# Patient Record
Sex: FEMALE | Race: WHITE | URBAN - METROPOLITAN AREA
[De-identification: names, ages, dates, MRNs, and addresses within clinical notes are randomized per-mention and may not be internally consistent; named-entity substitution may affect disease eponyms.]

---

## 2017-11-17 ENCOUNTER — OFFICE VISIT (OUTPATIENT)
Dept: ORTHOPEDICS | Facility: CLINIC | Age: 32
End: 2017-11-17
Payer: COMMERCIAL

## 2017-11-17 VITALS
DIASTOLIC BLOOD PRESSURE: 72 MMHG | BODY MASS INDEX: 21.03 KG/M2 | WEIGHT: 134 LBS | HEIGHT: 67 IN | SYSTOLIC BLOOD PRESSURE: 115 MMHG | HEART RATE: 52 BPM

## 2017-11-17 DIAGNOSIS — M25.532 LEFT WRIST PAIN: ICD-10-CM

## 2017-11-17 DIAGNOSIS — M65.4 RADIAL STYLOID TENOSYNOVITIS: Primary | ICD-10-CM

## 2017-11-17 PROCEDURE — 99203 OFFICE O/P NEW LOW 30 MIN: CPT | Performed by: FAMILY MEDICINE

## 2017-11-17 NOTE — LETTER
"    2017         RE: Ca Diane  [address] [address]        Dear Colleague,    Thank you for referring your patient, Ca Diane, to the [facility]. Please see a copy of my visit note below.    Ca Diane  :  1985  DOS: 2017  MRN: [id number]    Sports Medicine Clinic Visit        Ca Diane is a 32 year old Right hand dominant female who is seen as an AIC patient presenting with left radial sided wrist pain that began 2 months ago.    Injury: insidious onset.  Pain located over left radial styloid, radiating to the forearm  Additional Features:  Positive: weakness and pain.  Symptoms are better with No Treatment tried to date.  Symptoms are worse with: gripping, pinching, twisting.  Other evaluation and/or treatments so far consists of: Ice and Tylenol.  Recent imaging completed: No recent imaging completed.  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    No past medical history on file.  No past surgical history on file.    Objective  /72  Pulse 52  Ht 5' 6.76\" (1.696 m)  Wt 134 lb (60.8 kg)  BMI 21.14 kg/m2    General: healthy, alert and in no distress    HEENT: no scleral icterus or conjunctival erythema   Skin: no suspicious lesions or rash. No jaundice.   CV: regular rhythm by palpation, 2+ distal pulses, no pedal edema    Resp: normal respiratory effort without conversational dyspnea   Psych: normal mood and affect    Gait: nonantalgic, appropriate coordination and balance   Neuro: normal light touch sensory exam of the extremities. Motor strength as noted below       Left Wrist and Hand exam    Inspection:       Swelling: very mild 1st dorsal compartment    Tender:       " 1st dorsal compartment    Non Tender:       Remainder of the Wrist and Hand left,      distal radius left,      anatomic snuffbox left,      scapholunate interval left and      TFCC left    ROM:       Full and symmetric active and passive range of motion of the forearm, wrist and digits bilateral    Strength:       5/5 strength in the muscles of the hand, wrist and forearm bilateral    Special Tests:        neg (-) Tinel's test left,       neg (-) Phatlen's test left,       neg (-) TFCC compression test left and       positive (+) Finkelstein's maneuver left    Neurovascular:       2+ radial pulses bilaterally with brisk capillary refill and      normal sensation to light touch in the radial, median and ulnar nerve distributions      Radiology:  No imaging needed today    Assessment:  1. Radial styloid tenosynovitis    2. Left wrist pain        Plan:  Discussed the assessment with the patient.  Follow up: prn  Reviewed options for conservative care for Dequervain's  She has risk factors including recent pregnancy, ongoing breastfeeding, and likely more significant with positional/postural pain and lifting with growing infant  Reviewed RICE and thumb spica bracing, she declines brace today  She can return for this anytime without appt if needed, and provided examples of online braces to purchase as well  CSI available for labile sx but hope to avoid  Ergonomics reviewed  We discussed modified progressive pain-free activity as tolerated  Home handouts provided and supportive care reviewed  All questions were answered today  Contact us with additional questions or concerns  Signs and sx of concern reviewed      Seamus Oconnor DO, CHARITY  Primary Care Sports Medicine  [facility]             Disclaimer: This note consists of symbols derived from keyboarding, dictation and/or voice recognition software. As a result, there may be errors in the script that have gone undetected. Please consider this when  interpreting information found in this chart.      Again, thank you for allowing me to participate in the care of your patient.        Sincerely,        Seamus Oconnor, DO

## 2017-11-17 NOTE — PROGRESS NOTES
"Ca Diane  :  1985  DOS: 2017  MRN: [id number]    Sports Medicine Clinic Visit        Ca Diane is a 32 year old Right hand dominant female who is seen as an AIC patient presenting with left radial sided wrist pain that began 2 months ago.    Injury: insidious onset.  Pain located over left radial styloid, radiating to the forearm  Additional Features:  Positive: weakness and pain.  Symptoms are better with No Treatment tried to date.  Symptoms are worse with: gripping, pinching, twisting.  Other evaluation and/or treatments so far consists of: Ice and Tylenol.  Recent imaging completed: No recent imaging completed.  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    No past medical history on file.  No past surgical history on file.    Objective  /72  Pulse 52  Ht 5' 6.76\" (1.696 m)  Wt 134 lb (60.8 kg)  BMI 21.14 kg/m2    General: healthy, alert and in no distress    HEENT: no scleral icterus or conjunctival erythema   Skin: no suspicious lesions or rash. No jaundice.   CV: regular rhythm by palpation, 2+ distal pulses, no pedal edema    Resp: normal respiratory effort without conversational dyspnea   Psych: normal mood and affect    Gait: nonantalgic, appropriate coordination and balance   Neuro: normal light touch sensory exam of the extremities. Motor strength as noted below       Left Wrist and Hand exam    Inspection:       Swelling: very mild 1st dorsal compartment    Tender:       1st dorsal compartment    Non Tender:       Remainder of the Wrist and Hand left,      distal radius left,      anatomic snuffbox left,      scapholunate interval left and      TFCC left    ROM:       Full and symmetric active and passive range of " motion of the forearm, wrist and digits bilateral    Strength:       5/5 strength in the muscles of the hand, wrist and forearm bilateral    Special Tests:        neg (-) Tinel's test left,       neg (-) Phatlen's test left,       neg (-) TFCC compression test left and       positive (+) Finkelstein's maneuver left    Neurovascular:       2+ radial pulses bilaterally with brisk capillary refill and      normal sensation to light touch in the radial, median and ulnar nerve distributions      Radiology:  No imaging needed today    Assessment:  1. Radial styloid tenosynovitis    2. Left wrist pain        Plan:  Discussed the assessment with the patient.  Follow up: prn  Reviewed options for conservative care for Dequervain's  She has risk factors including recent pregnancy, ongoing breastfeeding, and likely more significant with positional/postural pain and lifting with growing infant  Reviewed RICE and thumb spica bracing, she declines brace today  She can return for this anytime without appt if needed, and provided examples of online braces to purchase as well  CSI available for labile sx but hope to avoid  Ergonomics reviewed  We discussed modified progressive pain-free activity as tolerated  Home handouts provided and supportive care reviewed  All questions were answered today  Contact us with additional questions or concerns  Signs and sx of concern reviewed      Seamus Oconnor DO, CHARITY  Primary Care Sports Medicine  [facility]             Disclaimer: This note consists of symbols derived from keyboarding, dictation and/or voice recognition software. As a result, there may be errors in the script that have gone undetected. Please consider this when interpreting information found in this chart.

## 2017-11-17 NOTE — MR AVS SNAPSHOT
"              After Visit Summary   2017    Ca Diane    MRN: [id number]           Patient Information     Date Of Birth          1985        Visit Information        Provider Department      2017 1:20 PM Seamus Oconnor,  [facility]        Today's Diagnoses     Radial styloid tenosynovitis    -  1    Left wrist pain           Follow-ups after your visit        Who to contact     If you have questions or need follow up information about today's clinic visit or your schedule please contact [facility] directly at [phone].  Normal or non-critical lab and imaging results will be communicated to you by Ubersensehart, letter or phone within 4 business days after the clinic has received the results. If you do not hear from us within 7 days, please contact the clinic through Ubersensehart or phone. If you have a critical or abnormal lab result, we will notify you by phone as soon as possible.  Submit refill requests through BOKU or call your pharmacy and they will forward the refill request to us. Please allow 3 business days for your refill to be completed.          Additional Information About Your Visit        MyChart Information     BOKU lets you send messages to your doctor, view your test results, renew your prescriptions, schedule appointments and more. To sign up, go to [email or web address] . Click on \"Log in\" on the left side of the screen, which will take you to the Welcome page. Then click on \"Sign up Now\" on the right side of the page.     You will be asked to enter the access code listed below, as well as some personal information. Please follow the directions to create your username and password.     Your access code is: [email or web address]  Expires: 2018 10:28 AM     Your access code will  in 90 days. If you need help or a new code, please call your [facility] or [phone].        Care EveryWhere ID     " "This is your Care EveryWhere ID. This could be used by other organizations to access your [facility] medical records  [id number]        Your Vitals Were     Pulse Height BMI (Body Mass Index)             52 5' 6.76\" (1.696 m) 21.14 kg/m2          Blood Pressure from Last 3 Encounters:   11/17/17 115/72    Weight from Last 3 Encounters:   11/17/17 134 lb (60.8 kg)              Today, you had the following     No orders found for display       Primary Care Provider    Physician No Ref-Primary       NO REF-PRIMARY PHYSICIAN        Equal Access to Services     [facility]: Hadii aad ku hadasho Soomaali, waaxda luqadaha, qaybta kaalmada adeegyada, holly romero . So [facility] [phone].    ATENCIÓN: Si habla español, tiene a johnson disposición servicios gratuitos de asistencia lingüística. Llame al [phone].    We comply with applicable federal civil rights laws and [place] laws. We do not discriminate on the basis of race, color, national origin, age, disability, sex, sexual orientation, or gender identity.            Thank you!     Thank you for choosing [facility]  for your care. Our goal is always to provide you with excellent care. Hearing back from our patients is one way we can continue to improve our services. Please take a few minutes to complete the written survey that you may receive in the mail after your visit with us. Thank you!             Your Updated Medication List - Protect others around you: Learn how to safely use, store and throw away your medicines at www.disposemymeds.org.          This list is accurate as of: 11/17/17 11:59 PM.  Always use your most recent med list.                   Brand Name Dispense Instructions for use Diagnosis    PRENATAL VITAMIN PO             "

## 2017-11-21 ENCOUNTER — TELEPHONE (OUTPATIENT)
Dept: ORTHOPEDICS | Facility: CLINIC | Age: 32
End: 2017-11-21

## 2017-11-21 NOTE — TELEPHONE ENCOUNTER
Call made to patient in follow up to AIC Visit.  LVM for return call.   Unable to LVM. Number not in service.

## 2019-05-03 ENCOUNTER — HEALTH MAINTENANCE LETTER (OUTPATIENT)
Age: 34
End: 2019-05-03

## 2019-11-06 ENCOUNTER — HEALTH MAINTENANCE LETTER (OUTPATIENT)
Age: 34
End: 2019-11-06

## 2020-04-24 ENCOUNTER — MEDICAL CORRESPONDENCE (OUTPATIENT)
Dept: HEALTH INFORMATION MANAGEMENT | Facility: CLINIC | Age: 35
End: 2020-04-24

## 2020-11-29 ENCOUNTER — HEALTH MAINTENANCE LETTER (OUTPATIENT)
Age: 35
End: 2020-11-29

## 2021-09-19 ENCOUNTER — HEALTH MAINTENANCE LETTER (OUTPATIENT)
Age: 36
End: 2021-09-19

## 2022-01-09 ENCOUNTER — HEALTH MAINTENANCE LETTER (OUTPATIENT)
Age: 37
End: 2022-01-09

## 2022-11-21 ENCOUNTER — HEALTH MAINTENANCE LETTER (OUTPATIENT)
Age: 37
End: 2022-11-21

## 2023-04-16 ENCOUNTER — HEALTH MAINTENANCE LETTER (OUTPATIENT)
Age: 38
End: 2023-04-16

## 2024-05-13 ENCOUNTER — LAB REQUISITION (OUTPATIENT)
Dept: LAB | Facility: CLINIC | Age: 39
End: 2024-05-13
Payer: COMMERCIAL

## 2024-05-13 DIAGNOSIS — N92.1 EXCESSIVE AND FREQUENT MENSTRUATION WITH IRREGULAR CYCLE: ICD-10-CM

## 2024-05-13 PROCEDURE — 84443 ASSAY THYROID STIM HORMONE: CPT | Mod: ORL | Performed by: OBSTETRICS & GYNECOLOGY

## 2024-05-14 LAB — TSH SERPL DL<=0.005 MIU/L-ACNC: 1.6 UIU/ML (ref 0.3–4.2)
